# Patient Record
Sex: FEMALE | URBAN - NONMETROPOLITAN AREA
[De-identification: names, ages, dates, MRNs, and addresses within clinical notes are randomized per-mention and may not be internally consistent; named-entity substitution may affect disease eponyms.]

---

## 2022-06-02 ENCOUNTER — APPOINTMENT (RX ONLY)
Dept: URBAN - NONMETROPOLITAN AREA CLINIC 13 | Facility: CLINIC | Age: 22
Setting detail: DERMATOLOGY
End: 2022-06-02

## 2022-06-02 DIAGNOSIS — Z41.9 ENCOUNTER FOR PROCEDURE FOR PURPOSES OTHER THAN REMEDYING HEALTH STATE, UNSPECIFIED: ICD-10-CM

## 2022-06-02 PROCEDURE — ? JUVEDERM ULTRA INJECTION

## 2022-06-02 NOTE — PROCEDURE: JUVEDERM ULTRA INJECTION
Tear Troughs Filler Volume In Cc: 0
Procedural Text: The filler was administered to the treatment areas noted above.
Additional Area 4 Location: R temple area
Additional Area 2 Location: lower lip
Lot #: [id number]
Expiration Date (Month Year): 7/22
Consent: Written consent obtained. Risks include but not limited to bruising, beading, irregular texture, ulceration, infection, allergic reaction, scar formation, incomplete augmentation, temporary nature, procedural pain.
Filler: Juvederm Ultra
Anesthesia Type: 1% lidocaine with epinephrine
Post-Care Instructions: Patient instructed to apply ice to reduce swelling.
Map Statement: See Attach Map for Complete Details
Vermilion Lips Filler Volume In Cc: 1
Additional Area 3 Location: lower face  rhytides
Price (Use Numbers Only, No Special Characters Or $): 579
Additional Area 5 Location: prem oral rhytides
Include Cannula Information In Note?: No
Additional Area 1 Location: corner of mouth
Additional Anesthesia Volume In Cc: 6
Detail Level: Detailed
Anesthesia Volume In Cc: 0.5